# Patient Record
Sex: MALE | Race: WHITE | Employment: OTHER | URBAN - METROPOLITAN AREA
[De-identification: names, ages, dates, MRNs, and addresses within clinical notes are randomized per-mention and may not be internally consistent; named-entity substitution may affect disease eponyms.]

---

## 2022-05-27 ENCOUNTER — HOME HEALTH ADMISSION (OUTPATIENT)
Dept: HOME HEALTH SERVICES | Facility: HOME HEALTH | Age: 77
End: 2022-05-27
Payer: MEDICARE

## 2022-06-01 ENCOUNTER — HOME CARE VISIT (OUTPATIENT)
Dept: SCHEDULING | Facility: HOME HEALTH | Age: 77
End: 2022-06-01
Payer: MEDICARE

## 2022-06-01 ENCOUNTER — HOME CARE VISIT (OUTPATIENT)
Dept: HOME HEALTH SERVICES | Facility: HOME HEALTH | Age: 77
End: 2022-06-01
Payer: MEDICARE

## 2022-06-01 VITALS
DIASTOLIC BLOOD PRESSURE: 63 MMHG | HEART RATE: 74 BPM | OXYGEN SATURATION: 100 % | SYSTOLIC BLOOD PRESSURE: 99 MMHG | TEMPERATURE: 97.2 F | RESPIRATION RATE: 16 BRPM

## 2022-06-01 PROCEDURE — 400013 HH SOC

## 2022-06-01 PROCEDURE — G0299 HHS/HOSPICE OF RN EA 15 MIN: HCPCS

## 2022-06-01 PROCEDURE — G0151 HHCP-SERV OF PT,EA 15 MIN: HCPCS

## 2022-06-01 PROCEDURE — A5120 SKIN BARRIER, WIPE OR SWAB: HCPCS

## 2022-06-01 NOTE — Clinical Note
Thank you.    ----- Message -----  From: Sophia العلي RN  Sent: 6/14/2022   3:24 PM EDT  To: Karley Mills PT      No call ever received back about drug interactions and issues. 2nd call placed today and spoke to: Sarahi Arnold, Dr Will Dinero CNA - She is reporting to Dr Elaine Read and they will call us back if any changes needed.

## 2022-06-01 NOTE — Clinical Note
Therapy Functional Score Assessment  Question   Score   Grooming  1       Upper Dressing 1      Lower Dressing 1      Bathing  5      Toilet Transfer  2    Transfer  2            Ambulation  3   Dyspnea                     1       Pain Interfering with activity 1  Est number therapy visits      9

## 2022-06-02 VITALS
HEART RATE: 78 BPM | SYSTOLIC BLOOD PRESSURE: 146 MMHG | DIASTOLIC BLOOD PRESSURE: 76 MMHG | RESPIRATION RATE: 12 BRPM | OXYGEN SATURATION: 99 % | TEMPERATURE: 98.6 F

## 2022-06-03 ENCOUNTER — HOME CARE VISIT (OUTPATIENT)
Dept: HOME HEALTH SERVICES | Facility: HOME HEALTH | Age: 77
End: 2022-06-03
Payer: MEDICARE

## 2022-06-03 NOTE — Clinical Note
Thank you  ----- Message -----  From: Roel Charles RN  Sent: 6/3/2022   5:01 PM EDT  To: Bertha Corona PT      Call placed to Dr Juana Carey office regarding Contraindicated Drug Combination with amLODIPine and simvastatin - Concurrent amlodipine may result in elevated levels of simvastatin,(1-7) which may result in myopathy and rhabdomyolysis. Unable to speak to staff, message left and a request for a call back to SN.

## 2022-06-03 NOTE — CASE COMMUNICATION
Call placed to  [facility] office regarding Contraindicated Drug Combination with amLODIPine and simvastatin - Concurrent amlodipine may result in elevated levels of simvastatin,(1-7) which may result in myopathy and rhabdomyolysis. Unable to speak to staff, message left and a request for a call back to .

## 2022-06-04 NOTE — HOME HEALTH
SUMMARY OF CLINICAL CONDITION:  Patient is a 68 y.o. male recently d/c from rehab after a long stay (3 months) for weakness associated with Rhabdomyolysis. He lives in a townhouse and has had several falls, most recemtly he was unable to get off of his toilet and fell to the floor, was found by a caregiver, who called EMS. He lives in a very cluttered setting, and is currently unable to clean due to his weakness and fall risk. SHERLY KEVIN OF [facility] d/c report 4/6/22:  Hospital Course:  HPI:  Maria D Garza is a 68 y.o.male followed by Dr. Jackie Jones with a past medical history of hypertension , hyperlipidemia, and diabetes on insulin therapy. He was originally admitted to [facility] from 03/18 through 3/27 with sepsis secondary to UTI with Enterobacter and also diabetic ketoacidosis , acute renal failure requiring hemodialysis , marked hypotension requiring pressors and midodrine as well as AFib with RVR. I will refer the reader to full H&P and discharge summary from that admission. On 03/27 he was transferred to [facility] for a plastic surgery consultation for eschar formation on his face following his fall and prolonged down time associated with the sepsis and his admission. Review of his [facility] records reflect upon arrival to [facility] his temperature was elevated to 102, antibiotic therapy was broadened to include vancomycin in addition to his cefepime and Flagyl therapy. Blood cultures were obtained as well as COVID testing all which were negative. The patient was placed in the critical care unit due to concerns regarding the prior GI bleed requiring 4 units of packed RBCs prior to his transfer. 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  Recommendations were once medically stable would consider debridement and placement of a skin substitute with the Simms wall STSG and direct left brow/forehead lift. Plans were made to return the patient to [facility] the following day as no immediate surgery was planned. On 3/29 His return to [facility] was delayed by repeat EGD after a Hgb drop to 6.0 and 1 UPRBC with findings of nonbleeding gastric ulcer with no stigmata of bleeding. Nonbleeding duodenal ulcer with flat pigmented spot treated with cautery Continue IV pantoprazole twice daily for at least 8 weeks with plans for repeat EGD for surveillance. During his stay was followed by Nephrology as well with aggressive phosphorus repletion as well as calcium repletion. His creatinine continued to improve and it was felt he was in recovery phase , his [place] catheter was removed on 03/30. He did not require additional dialysis while at [facility].     On 03/31 he return to [facility] where he was noted to have marked anasarca scrotal edema and marked edema of his right arm the site of his triple-lumen PICC line. He was seen upon arrival where he was talkative and cooperative, ill-appearing but not in acute distress he had an external Stephen which was draining clear urine he did have some noted twitching jerking in addition to his marked anasarca. HPI per Nephrology 3/31/2022: Follow-up patient with acute kidney injury, anasarca, hypotension and other fluid/electrolyte abnormalities  James Mtz is a 68 y.o.male with hypertension, type 2 diabetes mellitus, and dyslipidemia who was admitted on 03/18 after being found down on the floor of his apartment for unknown duration. He had WBC 32.4, potassium 5.9, bicarb 17, glucose 470, , creatinine 7.11, CK of 559, and phosphorus of 9.5. Urinalysis showed large leukocyte esterase with TNTC wbc's. COVID 19 negative.  Renal ultrasound showed increased echogenicity consistent with medical renal disease, no hydronephrosis. AFib with RVR, rate in the 140s. He was treated for septic shock with IV fluids, broad-spectrum antibiotics and vasopressors. With oligoanuria, hemodialysis was initiated on 03/19. For urinary retention, Stephen catheter placed on 03/20. DKA effectively treat  For GI bleed, transfused 4 units PRBCs. EGD on 03/23 showed multiple punched out gastric ulcers without stigmata, large show distal bulb ulcer with hemorrhage but without distinct vessel or treatable site. Urine grew E coli. Blood cultures showed no growth. Transferred to [facility] on 03/27 for plastic surgery intervention. Dr. Milton Carroll recommended that once medically stable to consider debridement and placement of a skin substitute. For suspected acute pyelonephritis started on empiric IV cefepime, metronidazole. Urine culture with no growth. Felt prudent to complete 7 day course. With possible skin infection source leukocytosis, started on vancomycin for 6 days through 4/2. EGD on 03/29 showed nonbleeding gastric ulcer with no stigmata of bleeding, nonbleeding duodenal ulcer with flat pigmented spot treated with cautery. Continued pantoprazole twice per day. For acute blood loss anemia, required PRBC transfusion. With episodes of hypotension, antihypertensive medications held, midodrine continued. With acute kidney injury, [facility] who provided aggressive calcium and phosphate resuscitation. With it felt no need for hemodialysis, dialysis catheter removed prior to discharge on 03/30. On 03/31, transferred back to [facility]. Patient is DNR/DNI     Admission Course:  ARF (acute renal failure) (CMS/[facility])  GFR unchanged at 18. Due to ATN from sepsis from urinary source  FENA of 3.1 supported intrinsic renal injury.   Baseline renal function unknown  SPEP and serum immunofixation on 03/18/2022 showed no monoclonal protein  JEFF, dsDNA, ANCA, C3 and C4 all known normal or negative. Free light chain ratio appropriate for his degree of TOSIN  Hepatitis-B surface antigen, B surface antibody, C antibody, HIV all negative. Renal ultrasound supported chronic medical renal disease  Bladder scan and straight cath p.r.n. PVR>300 ml if signs of obstruction but has been voiding on his own with condom cath  Patient previously had urinary outlet obstruction     Anasarca/resolved  Having excellent diuresis with albumin/furosemide  Recent UPC is 0.91-1.27 so not significant renal losses to account for this  Likely due to profound decreased oncotic pressure  With edema resolved, change lasix 60 mg b.i.d. to bumetanide 1 mg daily 4/5     Hypertension/hypotension  Systolic blood pressures 901- 180  Increased coreg to 25 mg b.i.d. 4/4  Increased amlodipine to 5 mg daily 4/5  Previously on midodrine 5 mg t.i.d. p.r.n. SBP <100 once off vasopressors     Hypophosphatemia  Corrected at present  For this, patient was administered various forms of phosphate at [facility] to replete. Suspect this was due to re-feeding hypophosphatemia. Hypocalcemia  Had corrected  Ionized calcium between 1.15-1.17 so due to hypoalbuminemia  Vitamin-D stores replete at 38 on 03/20  With ionized calcium down to 1.11, Rxed calcium gluconate 1 g IV x 2 and started Tums 1500 mg hs  Ca down to 7.7 -> increase Tums to 2000 mg hs     Hypomagnesemia, Hypokalemia  Magnesium corrected to 1.9  K 3.9     Anemia  Hemoglobin 7.8->8.4->8.8  Iron deficient, 23 13% sat on 03/27  S/p Venofer 200 x 5     Diarrhea  - resolved  S/p Flagyl  C diff negative 3/31  Has completed Rocephin and vancomycin that was initiated at [facility]     Afib:  Due to gastric ulcerations remained unable to anticoagulate, patient currently not in atrial fibrillation  Cont coreg    Facial Lesions: Will need to FU with plastic surgery outpatient for further treatment of face    He is DC to SNF for further rehab. He has two brothers to assist with decisions. Bryon Dominguez   PRIOR MEDICAL HISTORY:    Diabetes Type 2  HTN  Acute Renal Failure  High Cholesterol  . CAREGIVER INVOLVEMENT:   Patient has a brother wh lives out of state who assisted him with mail. He does not appear to have a family member close by and it is unclear as to his ststus re: Zorita Ormond, as he was not specific. He appears to require self reliance. Jacqueline Drake PHYSICAL THERAPY EVALUATION:  GAIT:  Unsteady, needs AD but clutter impairs use of walker. Able to use a cane. Short, uneven step lengths with frequent stopping for balance, decreased heel strike. Walked about 20' x 4. STAIRS:  Patient is unable to negotiate steps at this time. TRANSFERS:  Requires hand suppot. VC to sit next to an arm rest on couch. Patient has fallen due to inability to stand from his seating:  couch and toilet. He uses a commode rather than toilet and declined to place it over the toilet. BALANCE/ FALL RISK:  Tinetti: 7/28 indicates a high fall risk. BED MOBILITY:  Sleeps on couch. Needs assistance to transfer, intermittantly. Has fallen trying to get up off couch. STRENGTH:  BLE weakness present:  3-/5, see PT ROM  ROM:  WFL for BLEs  SUMMARY:   Patient presents with deficits, as described, in strength, gait and balance that impairs his ability to transfer, navigate his home and that increases his risk for falling and his dependence. HHPT is indicated in order to provide skilled interventions to improve and restore balance, strength, transfers and gait, that will improve his overall safety and lower his risk for falls and injury. Skilled interventions will include: Instruction in ther-ex, NMR, HEP for strength, balance improvement; Training in gait, transfers, balance; Education in fall prevention, safety, energy conservation. PT will monitor vital signs, pain and patient response to therapy. Jacqueline [facility] MEDICATION REVIEW COMPLETED. Medications reconciled and all medications are available in the home this visit.   SN has completed reconciliation today, no changes. HOME HEALTH SUPPLIES by type and quantity ordered/delivered this visit include: n/a  PATIENT EDUCATION PROVIDED: Ed re: safety with transfers, fall prevention, PT plan. Patient verbalized understanding. He was not willing to raise commode per education re: fall prevention, stated he can get on and off it without difficulty. HOME EXERCISE PROGRAM: Provided initial HEP to be adapted and progressed:  Sitting:  AP, LAQ, knee raises, x 20, sit to stand x 5 from chair. PLAN:  CONTINUED NEED for the following skills: Physical Therapy  Pt/Caregiver instructed on plan of care and are agreeable to plan of care at this time. DISCHARGE PLANNING discussed with patient and caregiver. [facility]1, 2w4, 1w1. Discharge when goals met or max potential achieved. Patient did verbalize understanding.

## 2022-06-07 ENCOUNTER — HOME CARE VISIT (OUTPATIENT)
Dept: HOME HEALTH SERVICES | Facility: HOME HEALTH | Age: 77
End: 2022-06-07
Payer: MEDICARE

## 2022-06-07 ENCOUNTER — HOME CARE VISIT (OUTPATIENT)
Dept: SCHEDULING | Facility: HOME HEALTH | Age: 77
End: 2022-06-07
Payer: MEDICARE

## 2022-06-07 PROCEDURE — G0157 HHC PT ASSISTANT EA 15: HCPCS

## 2022-06-08 ENCOUNTER — HOME CARE VISIT (OUTPATIENT)
Dept: SCHEDULING | Facility: HOME HEALTH | Age: 77
End: 2022-06-08
Payer: MEDICARE

## 2022-06-08 VITALS
OXYGEN SATURATION: 99 % | HEART RATE: 62 BPM | DIASTOLIC BLOOD PRESSURE: 70 MMHG | RESPIRATION RATE: 14 BRPM | TEMPERATURE: 98.2 F | SYSTOLIC BLOOD PRESSURE: 118 MMHG

## 2022-06-08 PROCEDURE — G0299 HHS/HOSPICE OF RN EA 15 MIN: HCPCS

## 2022-06-08 NOTE — HOME HEALTH
SUBJECTIVE: Patient reports he feels he is getting stronger. He reports he has difficulty getting to MD appts and getting his groceries and has meds to  at the pharmacy. CAREGIVER INVOLVEMENT/ASSISTANCE NEEDED FOR: Patient does not have a caregiver-manages everything himself-Brother lives out of state  Ansina 2484: None  OBJECTIVE:  See interventions. PATIENT RESPONSE TO TREATMENT:  Good no increase in pain reported  PATIENT LEVEL OF UNDERSTANDING OF EDUCATION PROVIDED: Patient reports he realizes he needs to keep pathways clear to decrease fall risk  ASSESSMENT OF PROGRESS TOWARD GOALS: Cues to equalize step length for each foot to pass the other and for consistent B heel strike with FWW on even surfaces within home-several rest breaks given-Sit to stand from low sofa with B UE to push up-cues to utilize arm rest at either  end of the sofa to assist.  Currently patient is sleeping downstairs on the sofa. Needs reinforcement with all mobility  CONTINUED NEED FOR THE FOLLOWING SKILLS: Patient will be seen 2 x week for Physical Therapy to continue to work toward goals within POC and HHPT is medically necessary to address  dx and clinical findings: decreased ROM, decreased strength, impaired gait, decreased ability w stair negotiation, increased swelling, decreased bed mobility, decreased transfer status, decreased endurance, decreased balance and decreased safety, increased pain in order to improve functional mobility/quality of life, decrease burden of care, reduce risk for re-hospitalization, work towards patient's personal goals of return to PLOF w decrease risk for falls. PLAN FOR NEXT VISIT: Gait/transfer training, strengthening exercises  THE FOLLOWING DISCHARGE PLANNING WAS DISCUSSED WITH THE PATIENT/CAREGIVER: This is visit number 2  out of  10  planned visits.   NEXT MD APPT::Dr Namrata Boykin on 6/22

## 2022-06-09 VITALS
DIASTOLIC BLOOD PRESSURE: 62 MMHG | SYSTOLIC BLOOD PRESSURE: 144 MMHG | RESPIRATION RATE: 18 BRPM | HEART RATE: 71 BPM | OXYGEN SATURATION: 99 %

## 2022-06-10 ENCOUNTER — HOME CARE VISIT (OUTPATIENT)
Dept: HOME HEALTH SERVICES | Facility: HOME HEALTH | Age: 77
End: 2022-06-10
Payer: MEDICARE

## 2022-06-10 ENCOUNTER — HOME CARE VISIT (OUTPATIENT)
Dept: SCHEDULING | Facility: HOME HEALTH | Age: 77
End: 2022-06-10
Payer: MEDICARE

## 2022-06-10 VITALS
SYSTOLIC BLOOD PRESSURE: 150 MMHG | DIASTOLIC BLOOD PRESSURE: 82 MMHG | RESPIRATION RATE: 16 BRPM | TEMPERATURE: 98.5 F | HEART RATE: 78 BPM | OXYGEN SATURATION: 95 %

## 2022-06-10 PROCEDURE — G0152 HHCP-SERV OF OT,EA 15 MIN: HCPCS

## 2022-06-10 PROCEDURE — G0157 HHC PT ASSISTANT EA 15: HCPCS

## 2022-06-10 NOTE — Clinical Note
Therapy Functional Score Assessment  Question                                            Score   Grooming                            1       Upper Dressing   0      Lower Dressing   2      Bathing                 5      Toilet Transfer                   2    Transfer                1            Ambulation                         3   Dyspnea                     3       Pain Interfering with activity        2  Est number therapy visits      6    Mr. Davie Desir presents with good rehab potential to increase his balance, strength and endurance for increased independence with functional mobility, ADLs, and IADL tasks. Pt completing ADLs with CGA to independent. Pt with poor hygiene routine expressing he has not been bathing and toilets/tub very dirty/soiled. Pt is quick to faitgue when completing house hold mobility with need for frequent rest breaks. Pt is agreeable to continued home health occupational therapy services    PLAN: D/C1w2, 2w2 with plans to discharge when goals are met or maximal potential achieved.

## 2022-06-10 NOTE — HOME HEALTH
SUMMARY OF CLINICAL CONDITION: Patient is a 68 y.o. male recently d/c from rehab after a long stay (3 months) for weakness associated with Rhabdomyolysis. He lives in a townhouse and has had several falls, most recently he was unable to get off of his toilet and fell to the floor, was found by a caregiver, who called EMS. He lives in a very cluttered setting and is currently unable to clean due to his weakness and fall risk. Sumi Eubanks is a 68 y.o.male with PMH of rhabdomyolysis, HTN, HLD, gastric ulcer, GI bleed, a fib with RVR not on anticoagulation, and DM type 2 presents to the ED for weakness and rhabdomyolysis.   PMH: Rhabdomyolysis POA, Hypertension, Diabetes type 2, no ocular involvement (CMS/HCC), Hypomagnesemia, Leukocytosis, Skin eschar, Chronic kidney disease, stage 4 (severe), TOSIN (acute kidney injury)     He is DC to SNF for further rehab. He has two brothers to assist with decisions. CAREGIVER INVOLVEMENT: Patient has a brother who lives out of state who assisted him with mail. He does not appear to have a family member close by and it is unclear as to his status with caregivers. He appears to require self-reliance with no caregivers nearbye. Pt reports he is trying to get meals on wheels and transportation. PLOF: Pt lives in 2 story town house alone. Pt was independent with ADLs/IADLs. Pt would use cane intermitantly for balance previously. Medications: Pt reported no changes to medications and educated to continue as directed per MD. Pt running low on some medications like metformin and trying to find transportation to pharmacy. SUBJECTIVE: Pt eating breakfast finishing up with PT. Pt reports no complaints of pain. DME ORDERED/RECOMMENEDED: N/A    OBJECTIVE:  BATHING: Pt has tub shower unit with no assisitive devices. Pt completes bathing with CGA. Pt reports minimally bathing. TOILETING: Pt uses both bedside commode and toilet for toileting with SBA for completion.  Pt preference for commode at this time for toielting   UB DRESSING: Pt independent for donning/doffing shirts  LB DRESSING: Pt SBA for lower body dressing to job/doff socks, shoes and pants. Pt reports heels are occasionally sore making slipping on shoes difficult  GROOMING: Pt standing for completion of grooming tasks at sink with modified independence for washing face/hands and oral care. FEEDING: Pt indepednent for self feeding    Modified Mita RPE 8/10 after performing ambulation, transfers, and I/ADL assessment     OT instructed/demonstrated pt the following with good understanding:     IADL: Pt able to complete simple meal prep. Pt with incerased difficulty with cleaning with otu very cluttered and dirty. Pt with no means for transportation at time of assessment. AMBULATION:  Pt CGA for ambulating short house hold distances using cane  EOB/BED TRANSFER: Pt sleeping on couch with bed on 2nd floor and pt expressing he does not like his bed. COUCH: Pt supervision for sit to stand transfers  TOILET: Pt CGA for sit to stand transfers from toilets. Note pt with very dirty toilets for hygenic use   TUB SHOWER:Pt explianed how he completed tub transfers but declinded trialing due to fatigue after acending stairs    PATIENT RESPONSE TO TREATMENT: Pt responded well to home health occupational therapy services with need for frequent seated rest breaks with pt quick to fatigue    PATIENT EDUCATION PROVIDED THIS VISIT: OT role, energy conservation, fall prevention/safety training ADL/IADL tasks, continue diet and medications as instructed per MD, consult MD or urgent care for medical assistance as opposed to ER unless situation emergent. Pt educated on clearing walk ways of clutter, throw rugs and securing franklyn to reduce fall hazards in the home. PATIENT LEVEL OF UNDERSTANDING OF EDUCATION PROVIDED:  Pt able to teach back role of OT with good understanding.  Pt able to teach back fall hazards and adjustments needed to increase saftey. Pt able to teach back energy conservation techniques but will need reinforcment for implamentation. REHAB POTENTIAL: Mr. Bertha Crespo presents with good rehab potential to increase his balance, strength and endurance for increased independence with functional mobility, ADLs, and IADL tasks. Pt completing ADLs with CGA to indepedent. Pt with poor hygein routine expressing he has not been bathing and toilets/tub very dirty/soiled. Pt is quick to faitgue when completing house hold mobility with need for frequent rest breaks. Pt is agreeable to continued [facility] occupaitonal therapy services    HOME EXERCISE PROGRAM: energy conservation while performing bathing, dressing, and setup such as set clothing out night before, gather items required to perform task in one trip, sit while performing tasks, take rest breaks as needed, perform shower/bathing on days with no other appointments or activities scheduled, etc.     CONTINUED NEED FOR THE FOLLOWING SKILLS: [facility] OT is medically necessary to address pain, decreased functional strength, decreased independence and safety with functional transfers, decreased independence and safety performing ADL/IADL tasks, decreased activity and standing tolerance, decreased functional endurance, and impaired balance in order to improve functional independence, obtain set goals, reduce risk of falls, reduce pain, improve quality of life, and return to PLOF. ASSESSMENT:  Pt living in a very cluttered living situation with trash on the floor and counters. Pt with heavily soiled floors and bathrooms. Pt with decreased adaquate hygein routine with pt minimally bathing and with very dirty toilets. Pt living environment and hygein routine places him at increased risks for infection. Pt would benfifit from home modification suggestions and saftey adjustments to reduce his risk for falls too. Pt with MSW ordered for further resources.  Pt with 4-/5 BUE strength grossly with pt quick to fatigue. Pt would benifit from BUE HEP to increase his strength and endurance for functional tasks. Pt with decreased activity tolerance noted by modified margie scale rating of 8/10 with light house hold mobility. Pt would benifit from energy conservation technique eduction and implamentation to increase his ability to complete ADLs, IADLs and functional mobility with increased success. Skilled Care Provided: Pt completed full occupational therapy assessment to include balance, coordination, strengthening, ADL education/training, functional mobility and home safety. PLAN: D/C1w2, 2w2 with plans to discharge when goals are met or maximal potenital achieved.

## 2022-06-10 NOTE — Clinical Note
Pt lives alone in 2 story town house. Pt lives in cluttered house that looks like it has not been cleaned for a while. Pt has cane, bedside commode, and walkers. Pt uses cane for house hold mobility and bedside commode most often for toileting. Please educated pt on fall hazards and reduce saftey hazards where possible in the home. Progress his BUE strengthening HEP. Provided energy conservation technique education/implamentation with pt 8/10 on modifie margie scale on eval. Pt would benifit from increasing his hygein routine with pt reporting not bathing with consistnacy needed. Please reach out with any questions.

## 2022-06-13 ENCOUNTER — HOME CARE VISIT (OUTPATIENT)
Dept: SCHEDULING | Facility: HOME HEALTH | Age: 77
End: 2022-06-13
Payer: MEDICARE

## 2022-06-13 PROCEDURE — G0155 HHCP-SVS OF CSW,EA 15 MIN: HCPCS

## 2022-06-13 NOTE — Clinical Note
MSW spoke to the pt and his brother's Elisa Tompkinsr and Stephon Rodriguez regarding the benefits of POA, meal deliveries, errand support/transportation, and housekeeping and laundry assistance if pt agrees. Pt does not have POA and his brother Elisa Hood assists with bill organization (not payment) via mail. Pt's brother Almas Dinero agreed to try to register pt for [employer] for medication support. MSW discussed request for SLP to assess pt's cognition. MSW scheduled transportation to pt's 6/22/22 MD appointment through [address] [place] [address], and provided pt the contact information to schedule future appointments. Meals on Wheels delivery is pending.

## 2022-06-13 NOTE — Clinical Note
Thank you!  ----- Message -----  From: Mary Nguyễn MSW  Sent: 6/14/2022   5:36 PM EDT  To: Imelda Nguyen PT      MSW spoke to the pt and his brother's Joanna Sep and Ravin Morgan regarding the benefits of POA, meal deliveries, errand support/transportation, and housekeeping and laundry assistance if pt agrees. Pt does not have POA and his brother Joanna Bowen assists with bill organization (not payment) via mail. Pt's brother Oliver Blackman agreed to try to register pt for [employer] tarah for medication support. MSW discussed request for SLP to assess pt's cognition. MSW scheduled transportation to pt's 6/22/22 MD appointment through [address] [place] [address], and provided pt the contact information to schedule future appointments. Meals on Wheels delivery is pending.

## 2022-06-13 NOTE — HOME HEALTH
Skilled reason for visit: SN assessment, education, wound care      Caregiver involvement: involved remotely. Medications reviewed and all medications are available in the home this visit. The following education was provided regarding medications:  Metformin: Instruct patient to report symptoms of lactic acidosis . Warn premenopausal anovulatory woman that ovulation and subsequent pregnancy may occur . Side effects may include diarrhea, dyspepsia, flatulence, nausea, vomiting, headache, increased sweating, asthenia, or unpleasant metallic taste . Tell patient to take immediate-release tablets and solution with meals and extended-release tablets and suspension with the evening meal . Advise patient to avoid excessive amounts of alcohol . .    MD notified of any discrepancies/look a-like medications/medication interactions: none  Medications are effective at this time. Home health supplies by type and quantity ordered/delivered this visit include: none this visit    Patient education provided this visit: fall prevention, wound care, meals on wheels information    Sharps education provided: NA    Patient level of understanding of education provided: Patient states that he understands all education      Skilled Care Performed this visit: SN assessment, education, wound      Patient response to procedure performed:  Patient responded well to visit without any increase in pain or discomfort        Agency Progress toward goals: progressing    Patient's Progress towards personal goals: progressing    Home exercise program: performing     Continued need for the following skills: Nursing    Plan for next visit: SN assessment, education, wound      Patient and/or caregiver notified and agrees to changes in the Plan of Care YES      The following discharge planning was discussed with the pt/caregiver:  Will d/c to home/self care when goals are met and patient is stable

## 2022-06-14 ENCOUNTER — HOME CARE VISIT (OUTPATIENT)
Dept: SCHEDULING | Facility: HOME HEALTH | Age: 77
End: 2022-06-14
Payer: MEDICARE

## 2022-06-14 PROCEDURE — G0156 HHCP-SVS OF AIDE,EA 15 MIN: HCPCS

## 2022-06-14 PROCEDURE — G0157 HHC PT ASSISTANT EA 15: HCPCS

## 2022-06-14 NOTE — HOME HEALTH
MSW met with the pt privately in his home. MSW provided the pt documentation of social service resources, 211 (resource hotline), [employer], and support programs. MSW discussed POA, provided information about care assistance options/costs/funding sources, and assisted pt in obtaining medical appointment transportation through [address] [place] [address]. With pt's verbal permission, MSW also contacted both of pt's brothers Funmi Serrato and Guanako Becky) to discuss challenges, resources, and benefits of assistance with business management/POA. MSW invited a return call from all parties if desired. Pt has a 6/22/22 appointment with Dr. Jeanine Randhawa at 9:30 am. Pt is awaiting delivery of a medical alert and the initiation of Meals on Wheels deliveries. Pt's brother Deb Mahmood assists with bill organization (pt is responsible for bill payment) and pt's brother Jasmeet Mahoney agreed to assist with access through technology (i.e. [employer] tarah registration, etc.); both brothers are in support of POA and encouraging the pt to utilize available services. SLP assessment is being requested. MSW invited a return call if resource questions arise.

## 2022-06-14 NOTE — CASE COMMUNICATION
No call ever received back about drug interactions and issues. 2nd call placed today and spoke to: Jayda Silverio, Dr Josh Kaur CNA - She is reporting to Dr Jeanine Randhawa and they will call us back if any changes needed.

## 2022-06-15 ENCOUNTER — HOME CARE VISIT (OUTPATIENT)
Dept: SCHEDULING | Facility: HOME HEALTH | Age: 77
End: 2022-06-15
Payer: MEDICARE

## 2022-06-15 ENCOUNTER — HOME CARE VISIT (OUTPATIENT)
Dept: HOME HEALTH SERVICES | Facility: HOME HEALTH | Age: 77
End: 2022-06-15
Payer: MEDICARE

## 2022-06-15 PROCEDURE — G0299 HHS/HOSPICE OF RN EA 15 MIN: HCPCS

## 2022-06-15 NOTE — Clinical Note
Awesome!    ----- Message -----  From: Luca Morales RN  Sent: 6/16/2022   7:55 AM EDT  To: Solo Knowles PT      Call received back today from Zelalem Mustafa at [facility] office about contraindicated Drug Combination with amLODIPine and simvastatin - Dr Pietro Asif wants the pt to stop simvastatin (ZOCOR) 40 mg tablet and to schedule a virtual appt to talk to Dr Pietro Asif about starting him on a different med.   Case communication with Royce Hess RN, PT and OT

## 2022-06-16 VITALS
HEART RATE: 72 BPM | DIASTOLIC BLOOD PRESSURE: 70 MMHG | OXYGEN SATURATION: 100 % | RESPIRATION RATE: 18 BRPM | TEMPERATURE: 97.8 F | SYSTOLIC BLOOD PRESSURE: 144 MMHG

## 2022-06-16 NOTE — CASE COMMUNICATION
Call received back today from Georgina Heath at Dr [facility] office about contraindicated Drug Combination with amLODIPine and simvastatin - Dr Elliot Elias wants the pt to stop simvastatin (ZOCOR) 40 mg tablet and to schedule a virtual appt to talk to Dr Elliot Elias about starting him on a different med.   Case communication with Ruddy Richter RN, PT and OT

## 2022-06-17 ENCOUNTER — HOME CARE VISIT (OUTPATIENT)
Dept: HOME HEALTH SERVICES | Facility: HOME HEALTH | Age: 77
End: 2022-06-17
Payer: MEDICARE

## 2022-06-17 ENCOUNTER — HOME CARE VISIT (OUTPATIENT)
Dept: SCHEDULING | Facility: HOME HEALTH | Age: 77
End: 2022-06-17
Payer: MEDICARE

## 2022-06-17 PROCEDURE — G0157 HHC PT ASSISTANT EA 15: HCPCS

## 2022-06-17 PROCEDURE — G0158 HHC OT ASSISTANT EA 15: HCPCS

## 2022-06-19 VITALS
HEART RATE: 75 BPM | OXYGEN SATURATION: 97 % | TEMPERATURE: 98 F | SYSTOLIC BLOOD PRESSURE: 148 MMHG | DIASTOLIC BLOOD PRESSURE: 50 MMHG | RESPIRATION RATE: 18 BRPM

## 2022-06-19 NOTE — HOME HEALTH
SUBJECTIVE: Pt voiced no concerns. Pt reports she has been following UE HEP daily  . CAREGIVER INVOLVEMENT/ASSISTANCE NEEDED FOR: Pt lives alone and report his faimily provides assistance as needed  . HOME HEALTH SUPPLIES BY TYPE AND QUANTITY ORDERED/DELIVERED THIS VISIT INCLUDE: None  . OBJECTIVE:  See interventions. .  Patient education provided this visit:  Pt educated on proper hand palacement whwn sitting and standing for increased safety  . Patient level of understanding of education provided: Pt able to implement education provided by [facility]. Inda Brittle Home exercise program: Shoulder flexion, shoulder abduction, horizontal adduction, elbow flexion, elbow extension, Forearm pronation and supination 10 reps X 2 sets with using water bottle  . RESPONSE TO TREATMENT: Pt had a positive response to treatment with no increased complaints of pain or fatigue  . ASSESSMENT OF PROGRESS TOWARD GOALS: Pt able to follow UE HEP with Sup. Pt able to complete sit to stand from sofa with CGA, in prpeeration for functional tansfer   . CONTINUED NEED FOR THE FOLLOWING SKILLS: HH OT is medically necessary to address decreased functional strength,  decreased independence and safety with functional transfers, decreased independence and safety performing ADL/IADL tasks, decreased functional endurance, and impaired balance in order to improve functional independence, obtain set goals, reduce risk of falls, improve quality of life, and return to PLOF. Inda Brittle PLAN FOR NEXT VISIT:CONDON will address ADL and balance training   .    THE FOLLOWING DISCHARGE PLANNING WAS DISCUSSED WITH THE PATIENT/CAREGIVER: Discharge to self and family under MD supervision once all goals have been met or patient has reached maximum potential.  Frequency remaining: 2x2

## 2022-06-20 NOTE — HOME HEALTH
Skilled reason for visit: SN assessment, education      Caregiver involvement:  ADL's, house keeping, transportation, meal prep. .    Medications reviewed and all medications are available in the home this visit. 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 Spoke to Dr. Davina Wells in regards, will have  MD notified of any discrepancies/look a-like medications/medication interactions: previously reported, medication stopped by Dr. Davina Wells, Patient notified and repeated back directions  Medications are effective at this time. Home health supplies by type and quantity ordered/delivered this visit include: none this visit    Patient education provided this visit: medication, simvastatin, follow up with visit on the 27th. Sharps education provided: Clinician instructed patient/CG on proper disposal of sharps: Containers should be made of hard plastic, be puncture-resistant and leakproof,   such as a laundry detergent or bleach bottle.  When the container is ¾ full, it should be sealed with tape and labeled   DO NOT RECYCLE prior to discarding in the regular trash.        Patient level of understanding of education provided: Patient states that he understands all education      Skilled Care Performed this visit: SN assessment, education      Patient response to procedure performed:  Patient responded well to visit without any increase in pain or discomfort        Agency Progress toward goals: progressing    Patient's Progress towards personal goals: progressing    Home exercise program: performs    Continued need for the following skills: Nursing    Plan for next visit: SN assessment, education      Patient and/or caregiver notified and agrees to changes in the Plan of Care YES      The following discharge planning was discussed with the pt/caregiver:  Will d/c to home/self care when goals are met and patient is stable

## 2022-06-21 ENCOUNTER — HOME CARE VISIT (OUTPATIENT)
Dept: SCHEDULING | Facility: HOME HEALTH | Age: 77
End: 2022-06-21
Payer: MEDICARE

## 2022-06-21 PROCEDURE — G0157 HHC PT ASSISTANT EA 15: HCPCS

## 2022-06-21 PROCEDURE — G0156 HHCP-SVS OF AIDE,EA 15 MIN: HCPCS

## 2022-06-21 PROCEDURE — G0158 HHC OT ASSISTANT EA 15: HCPCS

## 2022-06-22 ENCOUNTER — HOME CARE VISIT (OUTPATIENT)
Dept: SCHEDULING | Facility: HOME HEALTH | Age: 77
End: 2022-06-22
Payer: MEDICARE

## 2022-06-22 VITALS
OXYGEN SATURATION: 98 % | TEMPERATURE: 98.1 F | DIASTOLIC BLOOD PRESSURE: 71 MMHG | RESPIRATION RATE: 18 BRPM | HEART RATE: 68 BPM | SYSTOLIC BLOOD PRESSURE: 124 MMHG

## 2022-06-22 PROCEDURE — G0299 HHS/HOSPICE OF RN EA 15 MIN: HCPCS

## 2022-06-22 NOTE — Clinical Note
Thank you.    ----- Message -----  From: Jazmine Ochoa RN  Sent: 6/23/2022  10:57 AM EDT  To: Lyn Sunshine MSLUZMA      Patient is dc'd from  at this time. He has all medications in the home, is taking as directed. Has meals on wheels starting soon. Is making an effort in self care.

## 2022-06-22 NOTE — Clinical Note
Patient is dc'd from  at this time. He has all medications in the home, is taking as directed. Has meals on wheels starting soon. Is making an effort in self care.

## 2022-06-22 NOTE — HOME HEALTH
SUBJECTIVE: Pt voiced no concerns. Pt reports he was feeling tired from having other clinicians earlier in day. pt reports meals on wheels has not began but his brother has provided microwave meals  . CAREGIVER INVOLVEMENT/ASSISTANCE NEEDED FOR: Pt brother checkns in on pt and provides assistance with ADls as needed  . HOME HEALTH SUPPLIES BY TYPE AND QUANTITY ORDERED/DELIVERED THIS VISIT INCLUDE: None  . OBJECTIVE:  See interventions. .  Patient education provided this visit:  Pt educated on energy conservation techniques during IADLs  . Patient level of understanding of education provided: Pt able to implement education provided by [facility]. Demian Rivera Home exercise program: Shoulder flexion, shoulder abduction, horizontal adduction, elbow flexion, elbow extension, Forearm pronation and supination 10 reps X 2 sets with using water bottle 1 time daily  . RESPONSE TO TREATMENT: Pt had a positive response to treatment with no increased complaints of pain  . ASSESSMENT OF PROGRESS TOWARD GOALS: Pt able to follow UE HEP with Sup. Pt able to demonstarte ablitity to complete item retrieval duirng IADL sup . Demian Rivera CONTINUED NEED FOR THE FOLLOWING SKILLS: HH OT is medically necessary to address decreased functional strength,  decreased independence and safety with functional transfers, decreased independence and safety performing ADL/IADL tasks, decreased functional endurance, and impaired balance in order to improve functional independence, obtain set goals, reduce risk of falls, improve quality of life, and return to PLOF. Demian Rivera PLAN FOR NEXT VISIT:CONDON will address ADL and balance training   .    THE FOLLOWING DISCHARGE PLANNING WAS DISCUSSED WITH THE PATIENT/CAREGIVER: Discharge to self and family under MD supervision once all goals have been met or patient has reached maximum potential.  Frequency remaining:2X1

## 2022-06-23 ENCOUNTER — HOME CARE VISIT (OUTPATIENT)
Dept: SCHEDULING | Facility: HOME HEALTH | Age: 77
End: 2022-06-23
Payer: MEDICARE

## 2022-06-23 VITALS
RESPIRATION RATE: 18 BRPM | TEMPERATURE: 98.8 F | OXYGEN SATURATION: 96 % | HEART RATE: 74 BPM | SYSTOLIC BLOOD PRESSURE: 140 MMHG | DIASTOLIC BLOOD PRESSURE: 78 MMHG

## 2022-06-23 PROCEDURE — G0158 HHC OT ASSISTANT EA 15: HCPCS

## 2022-06-23 PROCEDURE — G0157 HHC PT ASSISTANT EA 15: HCPCS

## 2022-06-23 NOTE — HOME HEALTH
Skilled reason for visit: SN assessment, education      Caregiver involvement:  ADL's, house keeping, transportation, meal prep. .    Medications reviewed and all medications are available in the home this visit. The following education was provided regarding medications: All medications educated on  and reconciled. All medications prescribed are in the home and patient taking as prescribed. Common uses and side effects reviewed with patient  All medications that he is currently prescribed  . MD notified of any discrepancies/look a-like medications/medication interactions: none this visit. patient dc'd simvastatin as directed  Medications are effective at this time. Home health supplies by type and quantity ordered/delivered this visit include: none this visit    Patient education provided this visit: patient is using medical taxi for appointments, now has meals on wheels, and assitance from agency on aging, is taking medication as directed and getting stronger. Medications reconciled with chart. All are in the home and patient reports taking them daily as directed.     Sharps education provided: NA    Patient level of understanding of education provided: Patient states that he understands all education      Skilled Care Performed this visit: SN assessment, education      Patient response to procedure performed:  Patient responded well to visit without any increase in pain or discomfort        Agency Progress toward goals: completed nursing goals    Patient's Progress towards personal goals: progressing    Home exercise program: performing    Continued need for the following skills: Nursing    Plan for next visit: Patient  is dc'd from nursing    Patient and/or caregiver notified and agrees to changes in the Plan of Care YES      The following discharge planning was discussed with the pt/caregiver: charley

## 2022-06-23 NOTE — Clinical Note
Thank You.    ----- Message -----  From: JR Rosas  Sent: 6/23/2022  10:59 PM EDT  To: ROYAL Jimenez      Pt reports meals on wheels paperwork has been completed and services will begin tomorrow

## 2022-06-24 ENCOUNTER — HOME CARE VISIT (OUTPATIENT)
Dept: HOME HEALTH SERVICES | Facility: HOME HEALTH | Age: 77
End: 2022-06-24
Payer: MEDICARE

## 2022-06-24 ENCOUNTER — HOME CARE VISIT (OUTPATIENT)
Dept: SCHEDULING | Facility: HOME HEALTH | Age: 77
End: 2022-06-24
Payer: MEDICARE

## 2022-06-24 VITALS
SYSTOLIC BLOOD PRESSURE: 136 MMHG | HEART RATE: 72 BPM | OXYGEN SATURATION: 97 % | TEMPERATURE: 98 F | RESPIRATION RATE: 18 BRPM | DIASTOLIC BLOOD PRESSURE: 72 MMHG

## 2022-06-24 PROCEDURE — G0153 HHCP-SVS OF S/L PATH,EA 15MN: HCPCS

## 2022-06-24 NOTE — HOME HEALTH
RECENT HX OF CURRENT ILLNESS/REASON FOR REFERRAL:  Pt admitted 5/5/2022 -   5/11/2022 to 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 Hospital course detailed below by problem    Rhabdomyolysis - improved  Weakness  CK elevated on admission of 2493. Patient has elevated CK of 596 back in March 2022 . VSS. Myoglobin 3819 and UA shows large blood. No change in activity and patient is not taking a statin. He was down for approximately 8-12 hours. Presentation consistent with rhabdomyolysis. Was discharged from his rehab facility 2 days prior to admission and ambulates with a walker. Patient was given NS 1L bolus and started on sepsis fluids in the ED. CK downtrended and weakness gradually improved during the admission. PT/OT recommended SNF to improve his weakness. Plan  - Follow-up with PCP    TOSIN on CKD 4  Creatinine on admission 2.50 (baseline 2.90-3.00). Patient diagnosed with TOSIN and AFR with GFR of 18 in March 2022. Nephrology followed patient in March and with oligoanuria, hemodialysis was initiated on 03/19. Dr. Donavon Sebastian of [facility] also saw patient who provided aggressive calcium/phosphate resuscitation. Dialysis catheter was then removed because there was no further need for dialysis.  Cr downtrended to 1.71 by the time of admission. Recommendations  - Avoid nephrotoxic medications  - repeat BMP in 1 week     Hypertension  Patient's home medication includes amlodipine 5 mg, Coreg 25 mg b.i.d., and Bumex 1mg qd. Patient states that he is noncompliant to these medications. Blood pressure on arrival to /75. Weakness and episodes of falls may be secondary to hypotension. Patient also has a history of anasarca/lower leg swelling, but does not take his Bumex at home and it was held during this admission. Blood pressures were in the 787J to 136 systolic. Patient did not demonstrate any significant lower extremity edema during this hospitalization. Will continue to hold Bumex on discharge. EF in 3/2022 was 70%. Recommendations  - Continue home dose Coreg 25mg BID  - continue home amlodipine 5 mg daily  - Holding Bumex, follow-up with PCP to determine its necessity going forward  - repeat BMP in 1 week     Diabetes type 2  Home medications include Lantus 8 units and lispro. Patient is noncompliant to 8 units of Lantus, and only takes lispro at home. Recommendations  - Lantus 8 units in the morning  -Hold home Lantus 8U due to non-compliance, consider adding Lantus 8U in the AM if patient's blood glucose elevated     Gastric Ulcer   EGD on 3/2022 showed nonbleeding gastric ulcer. Patient was instructed to continue PPI twice daily with plans for repeat EGD for surveillance. Recommendations  -Continue Protonix BID     Atrial fibrillation  History of afib RVR on 3/2022. Due to gastric ulcerations remained unable to anticoagulate, patient currently in NSR. Patient remained rate controlled without any intervention during this hospitalization. Recommendations  - Follow-up as an outpatient     Hypomagnesemia   Magnesium of 1.4 on admission. Recommendations  - repeat as an outpatient 1 week     Eschar   Patient has eschar on face from fall in March of 2022.   He has seen Plastic surgery, Dr. Santhosh Wooten, who would consider debridement and placement of skin substitute once patient is medically stable. Recommendations  - Medihoney and silicone dressing on left and left anterior knee pressure ulcers  - Xeroform dressing to left hip wound  - rest of wounds open to air     Constipation  Recommendations  - over-the-counter stool softeners    Generalized weakness \"    PLOF/DIET/COMMUNICATION: I with ADLs/IADLs, driving    PAST MEDICAL HISTORY:  rhabdomyolysis, HTN, HLD, gastric ulcer, GI bleed, a fib with RVR not on anticoagulation, and DM type 2    CURRENT RESIDENCE/LIVING SITUATION:  Lives alone    EDUCATIONAL LEVEL:  POST SECONDARY EDUCATION    SUBJECTIVE (PT/FAMILY COMMENTS, THERAPIST OBSERVATIONS): Pt is alert, noted to walk in home without walker. Pt states, \"My comprehension is shot\"  Pt reports checking blood sugar earlier in week, states blood sugar was 122, 140. Pt reports Dr has not instructed him to check blood sugar daily. Pt reports drinking 1 glass water daily, reports he is will start Meals on Wheels today    MEDICATIONS REVIEWED: PROBLEMS, NO PROBLEMS     CAREGIVER INVOLVEMENT:  unclear of assistance    ASSESSMENT / Dennis Saxena / PLAN: Pt presents with mild cognitive and memory impairment. Comfort Zenobia Mental Status Exam administered. Pt scored 22 out of possible 30 indicating reduced cognitive and memory function. Score of 22 suggests Mild Cognitive Impairment. Pt does not currently have a medical dx of a neurocognitive impairment. Pt may benefit from referral to neurology or neuropsychological evaluation. Pts cognitive and memory impairment are of concern to impact safety and function in medication management, living alone, and in maintaining appropriate nutrition/hydration. ST is medically necessary to improve cognitive and memory skills and for training of memory strategies/methods to increase overall health and medical well-being.         PATIENT RESPONSE TO TREATMENT:  Pt with full participation    PATIENT LEVEL OF UNDERSTANDING OF EDUCATION PROVIDED: Pt agrees with ST POC      MD NOTIFIED OF POC AND FREQUENCY    PT GOALS: improve function    HOME EXERCISE PROGRAM:     DISCHARGE PLANNING - (ANTICIPATED DC DATE, DC PLAN): ST to d/c in 6 more visits/ wks or when goals met/max potential achieved, Pt/caregiver verbalized understanding

## 2022-06-25 NOTE — HOME HEALTH
SUBJECTIVE: Pt reports he will beging recieving meals on wheels today or tomorrow. Shreyas Conn CAREGIVER INVOLVEMENT/ASSISTANCE NEEDED FOR: Pt lives alone, Brother provides support as needed  . HOME HEALTH SUPPLIES BY TYPE AND QUANTITY ORDERED/DELIVERED THIS VISIT INCLUDE: Pt would benefit from shower chair. Pt dose not want shower chair at this time  . OBJECTIVE:  See interventions. .  Patient education provided this visit: Pt educated on energy conservation techniques during ADLs  . Patient level of understanding of education provided: Pt in agreeance to education provided by [facility]. Shreyas Conn Home exercise program:Shoulder flexion, shoulder abduction, horizontal adduction, elbow flexion, elbow extension, Forearm pronation and supination 10 reps X 2 sets with using water bottle 1 time daily  . RESPONSE TO TREATMENT: Pt had a positive response to treatment with no complaints of pain or fatigue  . ASSESSMENT OF PROGRESS TOWARD GOALS:Pt educated on energy conservation techniques during ADLs and able to verbalize understanding. Pt reports 4/10 modified margie following simulated bathing and dressing . Shreyas Conn CONTINUED NEED FOR THE FOLLOWING SKILLS: HH OT is medically necessary to address decreased functional strength,  decreased independence and safety with functional transfers, decreased independence and safety performing ADL/IADL tasks, decreased functional endurance, and impaired balance in order to improve functional independence, obtain set goals, reduce risk of falls, improve quality of life, and return to OF. Shreyas Conn PLAN FOR NEXT VISIT:[facility] will address ADL/IAFDL training.    .   THE FOLLOWING DISCHARGE PLANNING WAS DISCUSSED WITH THE PATIENT/CAREGIVER: Discharge to self and family under MD supervision once all goals have been met or patient has reached maximum potential.  Frequency remaining: 2X1

## 2022-06-27 VITALS
HEART RATE: 64 BPM | DIASTOLIC BLOOD PRESSURE: 66 MMHG | TEMPERATURE: 98.6 F | SYSTOLIC BLOOD PRESSURE: 120 MMHG | RESPIRATION RATE: 16 BRPM | OXYGEN SATURATION: 98 %

## 2022-06-28 ENCOUNTER — HOME CARE VISIT (OUTPATIENT)
Dept: SCHEDULING | Facility: HOME HEALTH | Age: 77
End: 2022-06-28
Payer: MEDICARE

## 2022-06-28 VITALS
TEMPERATURE: 98.5 F | HEART RATE: 73 BPM | DIASTOLIC BLOOD PRESSURE: 75 MMHG | OXYGEN SATURATION: 100 % | RESPIRATION RATE: 16 BRPM | SYSTOLIC BLOOD PRESSURE: 153 MMHG

## 2022-06-28 PROCEDURE — G0153 HHCP-SVS OF S/L PATH,EA 15MN: HCPCS

## 2022-06-28 PROCEDURE — G0157 HHC PT ASSISTANT EA 15: HCPCS

## 2022-06-29 ENCOUNTER — HOME CARE VISIT (OUTPATIENT)
Dept: HOME HEALTH SERVICES | Facility: HOME HEALTH | Age: 77
End: 2022-06-29
Payer: MEDICARE

## 2022-06-29 ENCOUNTER — HOME CARE VISIT (OUTPATIENT)
Dept: SCHEDULING | Facility: HOME HEALTH | Age: 77
End: 2022-06-29
Payer: MEDICARE

## 2022-06-29 PROCEDURE — G0158 HHC OT ASSISTANT EA 15: HCPCS

## 2022-06-29 NOTE — HOME HEALTH
SUBJECTIVE: Pt alert and cooperative. Pt reports that Meals on Wheels has started  CAREGIVER INVOLVEMENT / ASSISTANCE NEEDED FOR: n/a  HOME HEALTH SUPPLIES BY TYPE AND QUANTITY ORDERED/DELIVERED THIS VISIT INCLUDE:  n/a  OBJECTIVE / PATIENT RESPONSE TO TREATMENT / PATIENT LEVEL OF UNDERSTANDING OF EDUCATION PROVIDED: Pt is stimulable in recall and use of written visual chart for routine involving eating, drinking and taking meds. Pt with steady progress in following 3 step commands and in short term memory recall  ASSESSMENT OF PROGRESS TOWARD GOALS: Pt is making steady progress  CONTINUED NEED FOR THE FOLLOWING SKILLS:Pt presents with mild cognitive and memory impairment. Pts cognitive and memory impairment are of concern to impact safety and function in medication management, living alone, and in maintaining appropriate nutrition/hydration. ST is medically necessary to improve cognitive and memory skills and for training of memory strategies/methods to increase overall health and medical well-being.     PLAN FOR NEXT VISIT: cognitive communication tasks  THE FOLLOWING DISCHARGE PLANNING WAS DISCUSSED WITH THE PATIENT/CAREGIVER: ST to d/c in  5 more visits/ wks or when goals met/max potential achieved, Pt/caregiver verbalized understanding

## 2022-06-30 ENCOUNTER — HOME CARE VISIT (OUTPATIENT)
Dept: SCHEDULING | Facility: HOME HEALTH | Age: 77
End: 2022-06-30
Payer: MEDICARE

## 2022-06-30 VITALS
DIASTOLIC BLOOD PRESSURE: 68 MMHG | SYSTOLIC BLOOD PRESSURE: 148 MMHG | OXYGEN SATURATION: 96 % | HEART RATE: 60 BPM | TEMPERATURE: 97 F

## 2022-06-30 VITALS
DIASTOLIC BLOOD PRESSURE: 69 MMHG | TEMPERATURE: 97.8 F | OXYGEN SATURATION: 100 % | HEART RATE: 72 BPM | RESPIRATION RATE: 16 BRPM | SYSTOLIC BLOOD PRESSURE: 150 MMHG

## 2022-06-30 PROCEDURE — G0153 HHCP-SVS OF S/L PATH,EA 15MN: HCPCS

## 2022-06-30 NOTE — Clinical Note
Pt is meeting ST goals. Anticipate discharge from [address] [place] [address] early the week of 7/11/2022.   [id number] [facility] signed

## 2022-06-30 NOTE — CASE COMMUNICATION
Pt is not interested in HRT paratransit at this time and was encouraged to review his ambulance charges with his brother(s). Pt hopes to return to driving.

## 2022-06-30 NOTE — HOME HEALTH
SUBJECTIVE: Pt reports he was able to shower today ind  . CAREGIVER INVOLVEMENT/ASSISTANCE NEEDED FOR: Pt lives alone. Brother assits with community IADLs  . HOME HEALTH SUPPLIES BY TYPE AND QUANTITY ORDERED/DELIVERED THIS VISIT INCLUDE: NONE  . OBJECTIVE:  See interventions. .  Patient education provided this visit:  Pt educated on home safety and and benefit of keeping home clean for health ad safety  . Patient level of understanding of education provided: Pt in agreeance to education provided by HALINA Oliveira Home exercise program: Shoulder flexion, shoulder abduction, horizontal adduction, elbow flexion, elbow extension, Forearm pronation and supination 10 reps X 2 sets with using water bottle 1-2 times daily  . RESPONSE TO TREATMENT: Pt had a positive response to treatment with no increased complaints of pain or fatigue  . ASSESSMENT OF PROGRESS TOWARD GOALS: Pt able to demonstrate ability to complete upper and lower body bathing ind. Pt is Mod I for toileting to standard and bedside commode  . CONTINUED NEED FOR THE FOLLOWING SKILLS:  OT is medically necessary to address decreased functional strength,  decreased independence and safety with functional transfers, decreased independence and safety performing ADL/IADL tasks, decreased functional endurance, and impaired balance in order to improve functional independence, obtain set goals, reduce risk of falls, improve quality of life, and return to OF. Abiodun Oliveira PLAN FOR NEXT VISIT:JAYNA from [facility] OT.    .   THE FOLLOWING DISCHARGE PLANNING WAS DISCUSSED WITH THE PATIENT/CAREGIVER: Discharge to self and family under MD supervision once all goals have been met or patient has reached maximum potential.  Frequency remaining: 1X1 spoke with  who is very concerned about pt receiving MRI   explained the limitation due to her severe pain  will adjust medications however concern for increasing requirements that are no improving pt's symptoms  please call [phone] with any questions

## 2022-07-01 ENCOUNTER — HOME CARE VISIT (OUTPATIENT)
Dept: SCHEDULING | Facility: HOME HEALTH | Age: 77
End: 2022-07-01
Payer: MEDICARE

## 2022-07-01 ENCOUNTER — HOME CARE VISIT (OUTPATIENT)
Dept: HOME HEALTH SERVICES | Facility: HOME HEALTH | Age: 77
End: 2022-07-01
Payer: MEDICARE

## 2022-07-01 PROCEDURE — G0152 HHCP-SERV OF OT,EA 15 MIN: HCPCS

## 2022-07-01 PROCEDURE — 400013 HH SOC

## 2022-07-01 NOTE — HOME HEALTH
SUBJECTIVE: Pt alert and cooperative. Pt reports he has dentist appt tomorrow, will get a ride set up by [employer] / ASSISTANCE NEEDED FOR: n/a    HOME HEALTH SUPPLIES BY TYPE AND QUANTITY ORDERED/DELIVERED THIS VISIT INCLUDE:  n/a    OBJECTIVE / PATIENT RESPONSE TO TREATMENT / PATIENT LEVEL OF UNDERSTANDING OF EDUCATION PROVIDED: Pt demonstrates use of written visual chart for routine involving eating, drinking and taking meds. Pt has met goal in following 3 step commands and exhibits steady progress in short term memory recall. SLP discussed with Pt that he is meeting goals and anticipate d/c week early the week of 7/11/2022. Pt verbalized understanding, [address] signed    ASSESSMENT OF PROGRESS TOWARD GOALS: Pt is making steady progress    CONTINUED NEED FOR THE FOLLOWING SKILLS:Pt presents with mild cognitive and memory impairment. Pts cognitive and memory impairment are of concern to impact safety and function in medication management, living alone, and in maintaining appropriate nutrition/hydration. ST is medically necessary to improve cognitive and memory skills and for training of memory strategies/methods to increase overall health and medical well-being.       PLAN FOR NEXT VISIT: cognitive communication tasks    THE FOLLOWING DISCHARGE PLANNING WAS DISCUSSED WITH THE PATIENT/CAREGIVER: ST to anticipate d/c the week of 7/11/2022

## 2022-07-02 VITALS
TEMPERATURE: 98 F | OXYGEN SATURATION: 99 % | RESPIRATION RATE: 16 BRPM | SYSTOLIC BLOOD PRESSURE: 150 MMHG | DIASTOLIC BLOOD PRESSURE: 60 MMHG | TEMPERATURE: 98.5 F | SYSTOLIC BLOOD PRESSURE: 120 MMHG | SYSTOLIC BLOOD PRESSURE: 112 MMHG | OXYGEN SATURATION: 97 % | HEART RATE: 68 BPM | DIASTOLIC BLOOD PRESSURE: 60 MMHG | TEMPERATURE: 98.3 F | RESPIRATION RATE: 14 BRPM | SYSTOLIC BLOOD PRESSURE: 110 MMHG | RESPIRATION RATE: 14 BRPM | RESPIRATION RATE: 18 BRPM | HEART RATE: 66 BPM | HEART RATE: 71 BPM | DIASTOLIC BLOOD PRESSURE: 60 MMHG | TEMPERATURE: 97.4 F | SYSTOLIC BLOOD PRESSURE: 122 MMHG | OXYGEN SATURATION: 95 % | SYSTOLIC BLOOD PRESSURE: 130 MMHG | RESPIRATION RATE: 18 BRPM | OXYGEN SATURATION: 98 % | TEMPERATURE: 98 F | DIASTOLIC BLOOD PRESSURE: 70 MMHG | HEART RATE: 72 BPM | OXYGEN SATURATION: 99 % | TEMPERATURE: 97.6 F | DIASTOLIC BLOOD PRESSURE: 62 MMHG | HEART RATE: 64 BPM | HEART RATE: 78 BPM | OXYGEN SATURATION: 100 % | RESPIRATION RATE: 16 BRPM | DIASTOLIC BLOOD PRESSURE: 82 MMHG

## 2022-07-02 NOTE — HOME HEALTH
Kin Merary reports he is dizzy today-he was able to check his BS and he was uncertain however he was able to check it-he reports he probably needs to drink more water-patient got a glass of water to drink. He reports he has eaten today. CAREGIVER INVOLVEMENT/ASSISTANCE NEEDED FOR: Brother and Cousin assist patient with care packages-both live out of state and otherside patient has to care for himself. HOME HEALTH SUPPLIES BY TYPE AND QUANTITY ORDERED/DELIVERED THIS VISIT INCLUDE: none         OBJECTIVE:  See interventions. PATIENT RESPONSE TO TREATMENT:  Good -patient reports no pain -just fatigue/dizziness         PATIENT LEVEL OF UNDERSTANDING OF EDUCATION PROVIDED: Good-patient instructed if he feels worse to contact MD-verbalizes understanding    ASSESSMENT OF PROGRESS TOWARD GOALS: Patient declined activity due to not feeling well-he feels he needs to drink more water and is fatigued-instructed patient if sxs increase or do not resolve to contact MD-patient retrieved water and began drinking. Instructed patient to utilize [facility] as AD due to dizziness. CONTINUED NEED FOR THE FOLLOWING SKILLS: Patient will be seen 2 x week for Physical Therapy to continue to work toward goals within POC and HHPT is medically necessary to address  dx and clinical findings: decreased ROM, decreased strength, impaired gait, decreased ability w stair negotiation, increased swelling, decreased bed mobility, decreased transfer status, decreased endurance, decreased balance and decreased safety, increased pain in order to improve functional mobility/quality of life, decrease burden of care, reduce risk for re-hospitalization, work towards patient's personal goals of return to PLOF w decrease risk for falls.          PLAN FOR NEXT VISIT: Gait/transfer training, stair mobility, strengthening exercises         THE FOLLOWING DISCHARGE PLANNING WAS DISCUSSED WITH THE PATIENT/CAREGIVER: This is visit number  6 out of  10  planned visits.          NEXT MD APPT:Dr Rodo Quinn 6/22

## 2022-07-02 NOTE — HOME HEALTH
SUBJECTIVE: Patient reports he was able to get in touch with meals on wheels and sign up for an [employer]. Patient still does not have his meds picked up from [employer] due to no transportation. Patient denies pain however reports generalized stiffness overall. CAREGIVER INVOLVEMENT/ASSISTANCE NEEDED FOR: Brother and Cousin assist patient with care packages-both live out of state and otherside patient has to care for himself. HOME HEALTH SUPPLIES BY TYPE AND QUANTITY ORDERED/DELIVERED THIS VISIT INCLUDE: none    OBJECTIVE:  See interventions. PATIENT RESPONSE TO TREATMENT:  Good -patient reports no pain following treatment    PATIENT LEVEL OF UNDERSTANDING OF EDUCATION PROVIDED: Good-no cues needed with standing exercises for good quality-patient is able to follow written sinstructions  ASSESSMENT OF PROGRESS TOWARD GOALS: Timed sit to stand 1 x 5: 29.4 sec from std chair- working toward goal of less than 15 sec. Gait with cane-shuffling pattern cues to increase consistentcy of B heel strike to decrease overall fall risk -needs reinforcement  CONTINUED NEED FOR THE FOLLOWING SKILLS: Patient will be seen 2 x week for Physical Therapy to continue to work toward goals within POC and HHPT is medically necessary to address  dx and clinical findings: decreased ROM, decreased strength, impaired gait, decreased ability w stair negotiation, increased swelling, decreased bed mobility, decreased transfer status, decreased endurance, decreased balance and decreased safety, increased pain in order to improve functional mobility/quality of life, decrease burden of care, reduce risk for re-hospitalization, work towards patient's personal goals of return to PLOF w decrease risk for falls. PLAN FOR NEXT VISIT: Gait/transfer training, stair mobility, strengthening exercises    THE FOLLOWING DISCHARGE PLANNING WAS DISCUSSED WITH THE PATIENT/CAREGIVER: This is visit number  4 out of  10  planned visits.     NEXT MD APPT:Dr Gallo Kovacs 6/22

## 2022-07-02 NOTE — HOME HEALTH
SUBJECTIVE: Patient reports he was able to get in touch with meals on wheels and sign up for an [employer]. Patient still does not have [facility] due to no transportation. Patient denies pain however reports generalized stiffness overall. CAREGIVER INVOLVEMENT/ASSISTANCE NEEDED FOR: Brother and Cousin assist patient with care packages and otherside patient has to care for himself. HOME HEALTH SUPPLIES BY TYPE AND QUANTITY ORDERED/DELIVERED THIS VISIT INCLUDE: none  OBJECTIVE:  See interventions. PATIENT RESPONSE TO TREATMENT:  Good -patient reports no pain following treatment  PATIENT LEVEL OF UNDERSTANDING OF EDUCATION PROVIDED: Good-patient has cleared pathways a bit however kitchen is still very cluttered. Instructed patient to return to [facility] if pain/edema increase, if ill, dizziness or with fatigue, and inclement weather-while on pain meds to utilize FWW for night trips to the bathroom-patient agreed. ASSESSMENT OF PROGRESS TOWARD GOALS: Patient able to amb with cane with good sequencing however step lengths remain unequal-need reinforcement. Sit to stand is imporving and is more consistent with B UE to push up. CONTINUED NEED FOR THE FOLLOWING SKILLS: Patient will be seen 2 x week for Physical Therapy to continue to work toward goals within POC and HHPT is medically necessary to address  dx and clinical findings: decreased ROM, decreased strength, impaired gait, decreased ability w stair negotiation, increased swelling, decreased bed mobility, decreased transfer status, decreased endurance, decreased balance and decreased safety, increased pain in order to improve functional mobility/quality of life, decrease burden of care, reduce risk for re-hospitalization, work towards patient's personal goals of return to OF w decrease risk for falls.   PLAN FOR NEXT VISIT: Gait/transfer training, stair mobility, strengthening exercises  THE FOLLOWING DISCHARGE PLANNING WAS DISCUSSED WITH THE PATIENT/CAREGIVER: This is visit number  3 out of  10  planned visits.   NEXT MD APPT:Dr Anna Payan 6/22

## 2022-07-02 NOTE — HOME HEALTH
SUBJECTIVE Patient rpeorts no pain just generalized stiffness     CAREGIVER INVOLVEMENT/ASSISTANCE NEEDED FOR: Brother and Cousin assist patient with care packages-both live out of state and otherside patient has to care for himself. HOME HEALTH SUPPLIES BY TYPE AND QUANTITY ORDERED/DELIVERED THIS VISIT INCLUDE: none         OBJECTIVE:  See interventions. PATIENT RESPONSE TO TREATMENT:  Good -patient reports no pain following treatment         PATIENT LEVEL OF UNDERSTANDING OF EDUCATION PROVIDED: Patient able to follow instructions for safe car transfers       ASSESSMENT OF PROGRESS TOWARD GOALS: Car transfers Mod I after instruction by return demonstration, Gait without an AD Independent for curb step to ascend/descend. CONTINUED NEED FOR THE FOLLOWING SKILLS: Patient will be seen 2 x week for Physical Therapy to continue to work toward goals within POC and HHPT is medically necessary to address  dx and clinical findings: decreased ROM, decreased strength, impaired gait, decreased ability w stair negotiation, increased swelling,  decreased endurance, decreased balance and decreased safety, increased pain in order to improve functional mobility/quality of life, decrease burden of care, reduce risk for re-hospitalization, work towards patient's personal goals of return to PLOF w decrease risk for falls         PLAN FOR NEXT VISIT: Gait/transfer training, Tinetti test, strengthening exercises         THE FOLLOWING DISCHARGE PLANNING WAS DISCUSSED WITH THE PATIENT/CAREGIVER: This is visit number  8 out of  10  planned visits.  Discussed upcoming discharge for physical therapy -ST will be continuing-patient is in agreement and plans to continue with HEP              NEXT MD APPT:CAMPBELL

## 2022-07-02 NOTE — HOME HEALTH
SUBJECTIVE Patient reports [employer] starts tomorrow. Patient reporting overall fatigue but that he ate a big breakfast today. Patient was seen by MD yesterday and no changes with meds and he reports the transportation the ins provided was great    CAREGIVER INVOLVEMENT/ASSISTANCE NEEDED FOR: Brother and Cousin assist patient with care packages-both live out of state and otherside patient has to care for himself. HOME HEALTH SUPPLIES BY TYPE AND QUANTITY ORDERED/DELIVERED THIS VISIT INCLUDE: none    OBJECTIVE:  See interventions. PATIENT RESPONSE TO TREATMENT:  Good -patient reports no pain -just fatigue/dizziness    PATIENT LEVEL OF UNDERSTANDING OF EDUCATION PROVIDED: Good-instructed patient to remain witth FWW as an AD  if pain/edema occur, if ill, dizziness or with fatigue, and inclement weather- to utilize [address] for night trips to the bathroom-patient agreed. ASSESSMENT OF PROGRESS TOWARD GOALS: Sit to stand Mod Independent from Sofa  with wide PIETER and with no cues for hand placement needed. Requires FWW for good inital stance balance -then once upright balance improves. .  Supine to and from sit on the sofa Independnent. Gait within home 100' x 1 with cues for consistent B heel strike with cues and focus. Able to ascend 6 steps withrail for support-patient declined to go up or down further-he has flight of stairs to second floor where full bath is located and keeps his clothes.     CONTINUED NEED FOR THE FOLLOWING SKILLS: Patient will be seen 2 x week for Physical Therapy to continue to work toward goals within POC and HHPT is medically necessary to address  dx and clinical findings: decreased ROM, decreased strength, impaired gait, decreased ability w stair negotiation, increased swelling, decreased bed mobility, decreased transfer status, decreased endurance, decreased balance and decreased safety, increased pain in order to improve functional mobility/quality of life, decrease burden of care, reduce risk for re-hospitalization, work towards patient's personal goals of return to PLOF w decrease risk for falls    PLAN FOR NEXT VISIT: Gait/transfer training, car transfer, strengthening exercises    Justice PATIENT/CAREGIVER: This is visit number  7 out of  10  planned visits.       NEXT MD APPT:CAMPBELL

## 2022-07-02 NOTE — HOME HEALTH
SUBJECTIVE: Patient is in good spirits and reports he can now  get rides for MD appts  through his [employer] company. Patient reports he would like to try to walk to [employer] like he used to do. CAREGIVER INVOLVEMENT/ASSISTANCE NEEDED FOR: Patient lives alone-brother and cousin offer some assist and send \"care packages\" -both live in a different state  [address] ORDERED/DELIVERED THIS VISIT INCLUDE: None  OBJECTIVE:  See interventions. PATIENT RESPONSE TO TREATMENT:  Good no increase in pain  PATIENT LEVEL OF UNDERSTANDING OF EDUCATION PROVIDED: Good-wore well fitting shoes rather than non-skid socks  ASSESSMENT OF PROGRESS TOWARD GOALS: Improved B foot clearance with gait on uneven surfaces with walking stick 150' then short rest then 150' to return to condo. Patient fatigued following amb. Patient has been handwahing laundry in his sink in the kitchen. CONTINUED NEED FOR THE FOLLOWING SKILLS: Patient will be seen 2 x week for Physical Therapy to continue to work toward goals within POC and HHPT is medically necessary to address  dx and clinical findings: decreased ROM, decreased strength, impaired gait, decreased ability w stair negotiation, increased swelling, decreased bed mobility, decreased transfer status, decreased endurance, decreased balance and decreased safety, increased pain in order to improve functional mobility/quality of life, decrease burden of care, reduce risk for re-hospitalization, work towards patient's personal goals of return to PLOF w decrease risk for falls. PLAN FOR NEXT VISIT: Stair mobility, giat and transfer training, strengthening exercises  THE FOLLOWING DISCHARGE PLANNING WAS DISCUSSED WITH THE PATIENT/CAREGIVER: This is visit number 5  out of  10  planned visits.   NEXT MD APPT:Dr Singh Wray 06/22

## 2022-07-04 ENCOUNTER — HOME CARE VISIT (OUTPATIENT)
Dept: HOME HEALTH SERVICES | Facility: HOME HEALTH | Age: 77
End: 2022-07-04
Payer: MEDICARE

## 2022-07-04 VITALS
RESPIRATION RATE: 18 BRPM | TEMPERATURE: 92.2 F | OXYGEN SATURATION: 98 % | SYSTOLIC BLOOD PRESSURE: 134 MMHG | DIASTOLIC BLOOD PRESSURE: 62 MMHG | HEART RATE: 64 BPM

## 2022-07-04 NOTE — HOME HEALTH
Patient/Caregiver Subjective:  Pt report 5/10 pain in L big toe. area around toe nail and red and tender to touch. Pt instructed to contact MD  .  Caregiver involvement:  Pt brother assists with community IADLs as needed  . Medications reconciled and all medications are available in the home this visit. .  Patient education provided this visit:  Pt educated on importance of maintaining clutter free walkways and living space for increased safety. .  Home exercise program: Shoulder flexion, shoulder abduction, horizontal adduction, elbow flexion, elbow extension, Forearm pronation and supination 10 reps X 2 sets with using water bottle. 1-2  times daily  . Progress toward goals: Pt able to complete simple meal prep Mod I. Pt is Mod I for functional transfers including tolieting and showers. Pt demonstrates F+ balance during functional tasks.  Pt has reached max rehab potential

## 2022-07-05 ENCOUNTER — HOME CARE VISIT (OUTPATIENT)
Dept: HOME HEALTH SERVICES | Facility: HOME HEALTH | Age: 77
End: 2022-07-05
Payer: MEDICARE

## 2022-07-05 ENCOUNTER — HOME CARE VISIT (OUTPATIENT)
Dept: SCHEDULING | Facility: HOME HEALTH | Age: 77
End: 2022-07-05
Payer: MEDICARE

## 2022-07-05 PROCEDURE — G0151 HHCP-SERV OF PT,EA 15 MIN: HCPCS

## 2022-07-05 NOTE — HOME HEALTH
Patient d/c from PT d/t reached goals and reached max rehab potentials at this time. During this visit, patient presents with the following clinical findings:    SUBJECTIVE: Patient denies fall since last visit and denies pain. Patient reported he received his life alert today and will set it up later today. CAREGIVER ASSISTANCE: No caregiver present during this visit    MEDICATIONS RECONCILED AND UPDATED: no changes in medications at this time    MMT: presents with :  R hip flex 4-/5   R hip Abd 4/5   R hip add 4/5   R hip ext. 4/5  R knee flex 4/5  R knee ext. 4-/5    R ankle DF 4/5  L hip flex 4-/5  L hip Abd 4/5  L hip ext. 4/5    L hip add 4/5  L knee flex 4/5  L knee ext. 4-/5  L ankle DF 4/5    FTSTS: 22 seconds with bilateral UE push up  compared to BLE weakness present: 3-/5 during initial evaluation / reassessment visit. BALANCE: Patient scored 21/28 on Tinetti balance and gait test  compared to 7/28  during initial evaluation. BED MOBILITY: Patient is now indep in bed mobility without cues from Needs assistance to transfer intermittently during initial evaluation. Patient continues to sleep on couch. TRANSFERS: Patient is now indep in transfers to and from bed, chair, toilet, and car FWW without cues from Requires hand support. VC to sit next to an arm rest on couch. Patient has fallen due to inability to stand from his seating: couch and toilet during initial evaluation. He continues to use commode rather than toilet and declined to place it over the toilet. GAIT: Patient is now able to ambulate 150+ on even and uneven surfaces using FWW with decreased charles and wide PIETER  compared to Unsteady, needs AD but clutter impairs use of walker. Able to use a cane. Short, uneven step lengths with frequent stopping for balance, decreased heel strike. Walked about 20' x 4 during initial evaluation .      STAIRS: Patient able to negotiates 1 threshold to enter and exit home and curb using FWW independently, able to negotiates full flight of interior steps with railing and walking stick with supervision to set up assistance, non-reciprocal pattern compared to unable during initial evaluation. ASSESSMENT: Patient d/c to self under supervision of MD. Patient has met the goals established at the start of care. Pt instructed to continue to perform HEP and recommend using FWW for safe transfers and ambulation to prevent falls. PATIENT EDUCATION PROVIDED THIS VISIT TO INCLUDE: FALL PREVENTION TECHNIQUES: monitor medication that may alter mental status,  keeping walking pathways clean and clear of clutter and throw rugs, proper footwear, keeping night lights on for ability to see and easily, good visibility for safe transitioning thresholds and proper use and good compliance of using AD. ENERGY CONSERVATION TECHNIQUES:  rest, slow down, pacing, and complete tasks in manageable steps. S/S OF INFECTION: increased pain, swelling, redness, warmer skin surrounding, fever  PATIENT LEVEL OF UNDERSTANDING OF EDUCATION PROVIDED: Pt verbalized understanding but compliance is poor to fair as evidenced by patient not always using FWW when navigating home. Patient response to procedure performed: Patient is now indep in functional mobility skills using FWW, supervision to set up assistance to negotiates full flight of interior steps for safety. He continues to be a high fall risk due poor to fair compliance of using FWW for household ambulation and presence of clutters in patient's living space. Patient/caregiver instructed to contact MD if medical issue arises. Pt/cg verbalized understanding. Care coordination done with PTA regarding POC, progress made and d/c plans. Patient reported he was last seen by his PCP on 6/27/22 and next f/u will be in September of this year,  Care coordinated with Jessica Luna regarding bath chair.  Per Gala Villatoro, she will resend order to MD.

## 2022-07-05 NOTE — Clinical Note
Patient was seen for [address] [id number]  discharge visit. Pt had education on pain management techniques, graded therapeutic exercises, balance training, bed mobility training, transfers training, HEP ed, gait/steps management training and d/c planning services. Patient has met goals and is ready for DC. Patient able to teach back HEP and medications. No issues noted at discharge. Patient is aware to follow up with PCP and other MD's. Please call #[phone] with any questions. Thank you! Aly Head

## 2022-07-11 VITALS
OXYGEN SATURATION: 98 % | RESPIRATION RATE: 18 BRPM | HEART RATE: 67 BPM | SYSTOLIC BLOOD PRESSURE: 134 MMHG | TEMPERATURE: 97.2 F | DIASTOLIC BLOOD PRESSURE: 66 MMHG

## 2022-07-12 ENCOUNTER — HOME CARE VISIT (OUTPATIENT)
Dept: SCHEDULING | Facility: HOME HEALTH | Age: 77
End: 2022-07-12
Payer: MEDICARE

## 2022-07-12 PROCEDURE — G0153 HHCP-SVS OF S/L PATH,EA 15MN: HCPCS

## 2022-07-14 VITALS
RESPIRATION RATE: 16 BRPM | SYSTOLIC BLOOD PRESSURE: 124 MMHG | HEART RATE: 69 BPM | TEMPERATURE: 98.4 F | DIASTOLIC BLOOD PRESSURE: 72 MMHG | OXYGEN SATURATION: 100 %

## 2022-07-14 NOTE — HOME HEALTH
SUBJECTIVE: Pt alert and cooperative. Pt states, \"You're memory exercise has been helpful. I was able to recite my medications. \"  Pt reports he received lifealert but has not started to use it. SLP advised Pt to begin wearing and using his Lifealert for safety. Pt reports he did his laundry the other day, was able to take laundry to the complex laundry facility. Pt reports his next appt with PCP is Sept 22     CAREGIVER INVOLVEMENT / ASSISTANCE NEEDED FOR: n/a    HOME HEALTH SUPPLIES BY TYPE AND QUANTITY ORDERED/DELIVERED THIS VISIT INCLUDE:  n/a     OBJECTIVE / PATIENT RESPONSE TO TREATMENT / PATIENT LEVEL OF UNDERSTANDING OF EDUCATION PROVIDED: Pt met ST goals. Med rec completed. Agency d/c completed. Mary Jurado DO was notified of d/c.

## 2024-01-27 NOTE — Clinical Note
thank you!  ----- Message -----  From: Pamela Wilson RN  Sent: 6/16/2022   7:55 AM EDT  To: Annie Cooper OT      Call received back today from [facility] at [facility] office about contraindicated Drug Combination with amLODIPine and simvastatin - Dr Betty Asher wants the pt to stop simvastatin (ZOCOR) 40 mg tablet and to schedule a virtual appt to talk to Dr Betty Asher about starting him on a different med.   Case communication with Merry Reyes RN, PT and OT Private car

## 2024-03-11 NOTE — HOME HEALTH
Faxed   Skilled services/Home bound verification:     Skilled Reason for admission/summary of clinical condition:  Patient was recently hospitalized for Rhabdomyolysis, requiring observation by a SN for s/s of decomposition or adverse effects resulting from newly prescribed medications. Skilled observation needed to determine if new medication regimen prescribed requires modifications or other therapeutic interventions considered until pt's clinical condition or treatment has stabilized. .  This patient is homebound for the following reasons Requires considerable and taxing effort to leave the home , Requires the assistance of 1 or more persons to leave the home  and Only leaves the home for medical reasons or [facility] services and are infrequent and of short duration for other reasons . Caregiver: relative. Caregiver assists with Caregiver assists patient with bathing, dressing, bathroom, meal prep and setup, medication management, grocery shopping, household chores, transportation to MD appointment and home exercise program..    Medications reconciled and all medications are available in the home this visit. The following education was provided regarding medications: metFORMIN (GLUCOPHAGE) 850 mg tablet - Purpose: To reduce hyperglycemia (high blood sugars) in Diabetics              Precautions/Side Effects/Adverse reactions:    low blood sugars, anxiety confusion, blurred vision           High risk medication teaching regarding anticoagulants, hyperglycemic agents or opoid narcotics performed (specify) metFORMIN (GLUCOPHAGE) 850 mg tablet, see above    Linsey Aranda MD and Annabel Oglesby, DO notified of any discrepancies/look a like medications/medication interactions. No severe drug interactions noted.  Patient is taking at home: amLODIPine (NORVASC) 5 mg tablet metFORMIN (GLUCOPHAGE) 850 mg tablet, metoprolol tartrate (LOPRESSOR) 50 mg tablet, simvastatin (ZOCOR) 40 mg tablet    Home health supplies by type and quantity ordered/delivered this visit include: All wound care supplies    Patient education provided this visit to include:   Skilled reason for visit: Patient was recently hospitalized for  , requiring observation by a SN for s/s of decomposition or adverse effects resulting from newly prescribed medications. Skilled observation needed to determine if new medication regimen prescribed requires modifications or other therapeutic interventions considered until pt's clinical condition or treatment has stabilized. Caregiver involvement:  Patient's caregiver is family and neighbors. Caregiver assists patient with bathing, dressing, walking, bathroom, meal prep and setup, medication management, grocery shopping, household chores, transportation to MD appointment and home exercise program.  Medications reconciled and all medications are available in the home this visit. The following education was provided regarding medications, medication interactions, and look alike modifications. Contact Agency or MD with questions. Medications are effective at this time. Patient states understanding. Patient education provided this visit:  Patient education provided this visit:  . AGBY Castillo Records Disease Management: Rhabdomyolysis, TOSIN, DM, wound care teaching, pain management, constipation prevention, fall precautions, s/s of infection, deep breathing exercises. .x.... Breathing Exercises (HEP):): Reviewed HEP in admission packet - patient performed return demonstration of deep breathing exercise. Patient to perform 5 deep breathing exercises every 2 hours, while awake. Ted Castillo Records Anna Records Diet/Nutrition: Importance of a healthy, protein diet, to promote healing and wellness, proper hydration, pineapple juice for inflammation and bruising  . MERLE. Luis Castillo Records Timed Voiding: bathroom visits Q 2 hours while awake  . Ted Saldivar... Wound Care:   Keep wound clean, s/s of infection, elevate  . Ted Saldivar...  Infection Control:   Proper hand washing with soap and water, after each trip to the bathroom and after eating meals  . Milderd Clemente... Other:  safety and fall precautions, importance of repositioning self to reduce pressure points    Patient level of understanding of education provided: : Pt verbalized understanding of all education and repeated back teaching     [facility] Education Provided: NA  Patient response to procedure performed:  Pt verbalized satisfaction with wound care    Home exercise program/Homework provided: PT and OT    Pt/Caregiver instructed on plan of care and are agreeable to plan of care at this time. Physician Armando Gonzalez MD and Alka Wright DO notified of patient admission to home health and plan of care including anticipated frequency of SN and treatments/interventions/modalities of SN. Discharge planning discussed with patient and caregiver. Discharge planning as follows: SN discharge when teaching and goals are met. Tim Lilly Pt/Caregiver did verbalize understanding of discharge planning. Next MD appointment 6/2/22 (date) with Dr Cnidy Mandujano MD.  Kim Lynn encouraged/instructed to keep appointment as lack of follow through with physician appointment could result in discontinuation of home care services for non-compliance.     Wound care consult with Luis Manuel Giles RN - extra wounds listed have healed